# Patient Record
Sex: MALE | Race: WHITE | NOT HISPANIC OR LATINO | Employment: STUDENT | URBAN - METROPOLITAN AREA
[De-identification: names, ages, dates, MRNs, and addresses within clinical notes are randomized per-mention and may not be internally consistent; named-entity substitution may affect disease eponyms.]

---

## 2023-04-30 ENCOUNTER — OFFICE VISIT (OUTPATIENT)
Dept: URGENT CARE | Facility: URGENT CARE | Age: 18
End: 2023-04-30
Payer: COMMERCIAL

## 2023-04-30 VITALS
HEART RATE: 105 BPM | HEIGHT: 72 IN | DIASTOLIC BLOOD PRESSURE: 70 MMHG | BODY MASS INDEX: 17.61 KG/M2 | SYSTOLIC BLOOD PRESSURE: 100 MMHG | TEMPERATURE: 97.4 F | WEIGHT: 130 LBS | RESPIRATION RATE: 16 BRPM | OXYGEN SATURATION: 97 %

## 2023-04-30 DIAGNOSIS — K52.9 GASTROENTERITIS: Primary | ICD-10-CM

## 2023-04-30 PROCEDURE — 99203 OFFICE O/P NEW LOW 30 MIN: CPT | Performed by: PREVENTIVE MEDICINE

## 2023-04-30 NOTE — LETTER
April 30, 2023      Francisco Javier Bedoya  [id number] [place] [address]        To Whom It May Concern:    Francisco Javier Bedoya  was seen on 4/30/23.  He is excused from work on 4/27/23 and 4/30/23      Sincerely,        Humza Casiano MD

## 2023-05-01 NOTE — PROGRESS NOTES
Assessment & Plan     1. Gastroenteritis  - Enteric Bacteria and Virus Panel by [facility] Stool; Future  - C. difficile Toxin B PCR with reflex to C. difficile Antigen and Toxins A/B EIA; Future    Fluids, rest, follow up if not improving in 10-14 days or sooner as needed  Stool tests pending         No follow-ups on file.    Humza Casiano MD  [facility]    Subjective     Francisco Javier Bedoya is a 17 year old year old male who presents to clinic today for the following health issues:    Patient presents with:  Urgent Care  Vomiting: Vomiting and diarrhea, concern of food poisoning. Started last Wednesday, fever Thursday and Friday. Started up again last night.     This is a 18 yo man who has had nausea, vomiting, diarrhea for 4 days.  Improving in the past day but not fully resolved.  No blood in stool or vomit.  No fever or abdominal pain.  Low grade elevated temperature 100F 2 days ago.  No recent abx  No travel or sick contacts.        There is no problem list on file for this patient.      No current outpatient medications on file.     No current facility-administered medications for this visit.       No past medical history on file.    Social History       No family history on file.    Review of Systems  Constitutional, HEENT, cardiovascular, pulmonary, GI, , musculoskeletal, neuro, skin, endocrine and psych systems are negative, except as otherwise noted.      Objective    /70   Pulse 105   Temp 97.4  F (36.3  C) (Temporal)   Resp 16   Ht 1.829 m (6')   Wt 59 kg (130 lb)   SpO2 97%   BMI 17.63 kg/m    Physical Exam   GENERAL: healthy, alert and no distress  EYES: Eyes grossly normal to inspection, PERRL and conjunctivae and sclerae normal  HENT: ear canals and TM's normal, nose and mouth without ulcers or lesions, moist mucus membranes  NECK: no adenopathy, no asymmetry, masses, or scars and thyroid normal to palpation  RESP: lungs clear to auscultation - no rales,  rhonchi or wheezes  CV: regular rate and rhythm, normal S1 S2, no S3 or S4, no murmur, click or rub, no peripheral edema and peripheral pulses strong  ABDOMEN: soft, nontender, no hepatosplenomegaly, no masses and bowel sounds normal  MS: no gross musculoskeletal defects noted, no edema  SKIN: no suspicious lesions or rashes  NEURO: Normal strength and tone, mentation intact and speech normal  PSYCH: mentation appears normal, affect normal/bright

## 2023-06-04 ENCOUNTER — HEALTH MAINTENANCE LETTER (OUTPATIENT)
Age: 18
End: 2023-06-04

## 2024-07-28 ENCOUNTER — HEALTH MAINTENANCE LETTER (OUTPATIENT)
Age: 19
End: 2024-07-28

## 2025-08-10 ENCOUNTER — HEALTH MAINTENANCE LETTER (OUTPATIENT)
Age: 20
End: 2025-08-10